# Patient Record
Sex: FEMALE | Race: AMERICAN INDIAN OR ALASKA NATIVE
[De-identification: names, ages, dates, MRNs, and addresses within clinical notes are randomized per-mention and may not be internally consistent; named-entity substitution may affect disease eponyms.]

---

## 2018-08-01 PROCEDURE — 3E0P7VZ INTRODUCTION OF HORMONE INTO FEMALE REPRODUCTIVE, VIA NATURAL OR ARTIFICIAL OPENING: ICD-10-PCS | Performed by: OBSTETRICS & GYNECOLOGY

## 2018-08-02 ENCOUNTER — HOSPITAL ENCOUNTER (INPATIENT)
Dept: HOSPITAL 5 - LD | Age: 23
LOS: 3 days | Discharge: HOME | End: 2018-08-05
Attending: OBSTETRICS & GYNECOLOGY | Admitting: OBSTETRICS & GYNECOLOGY
Payer: MEDICAID

## 2018-08-02 DIAGNOSIS — O42.92: ICD-10-CM

## 2018-08-02 DIAGNOSIS — Z3A.39: ICD-10-CM

## 2018-08-02 DIAGNOSIS — O36.5930: Primary | ICD-10-CM

## 2018-08-02 LAB
HCT VFR BLD CALC: 34.8 % (ref 30.3–42.9)
HGB BLD-MCNC: 11.7 GM/DL (ref 10.1–14.3)
MCH RBC QN AUTO: 28 PG (ref 28–32)
MCHC RBC AUTO-ENTMCNC: 34 % (ref 30–34)
MCV RBC AUTO: 82 FL (ref 79–97)
PLATELET # BLD: 263 K/MM3 (ref 140–440)
RBC # BLD AUTO: 4.24 M/MM3 (ref 3.65–5.03)

## 2018-08-02 PROCEDURE — 85014 HEMATOCRIT: CPT

## 2018-08-02 PROCEDURE — 85027 COMPLETE CBC AUTOMATED: CPT

## 2018-08-02 PROCEDURE — 85018 HEMOGLOBIN: CPT

## 2018-08-02 PROCEDURE — 87806 HIV AG W/HIV1&2 ANTB W/OPTIC: CPT

## 2018-08-02 PROCEDURE — 86850 RBC ANTIBODY SCREEN: CPT

## 2018-08-02 PROCEDURE — 86901 BLOOD TYPING SEROLOGIC RH(D): CPT

## 2018-08-02 PROCEDURE — 86900 BLOOD TYPING SEROLOGIC ABO: CPT

## 2018-08-02 PROCEDURE — 36415 COLL VENOUS BLD VENIPUNCTURE: CPT

## 2018-08-02 PROCEDURE — 88307 TISSUE EXAM BY PATHOLOGIST: CPT

## 2018-08-02 PROCEDURE — 86592 SYPHILIS TEST NON-TREP QUAL: CPT

## 2018-08-02 NOTE — HISTORY AND PHYSICAL REPORT
History of Present Illness


Date of examination: 18


Date of admission: 


18 19:32





Chief complaint: 





IUP@39weeks with IUGR, presents for induction by [facility] recommendation, states 

she was cephalic at [facility] office today. 


History of present illness: 





Past Pregnancy History 


   :      2


   Term Births:      1


   Premature Births:   0


   Living Children:   1


   Para:      1


   Mult. Births:      0


   Prev :   0


   Prev.  attempt?   0


   Aborta:      0


   Elect. Ab:      0


   Spont. Ab:      0


   Ectopics:      0





Pregnancy # 1


   Delivery date:     


   Weeks Gestation:   37


    labor:     no


   Delivery type:     


   Infant Sex:      Male


   Birth weight:      5#


   Comments:      no complications








Past Medical History:


   HSV





Past Surgical History:


   negative





Past Medical History 


Anesthesia Complications: negative


Anemia: negative


Autoimmune Disorder: negative


Bleeding Disorder: negative


Blood Transfusions: negative


Breast Disease: negative


Diabetes: negative


Heart Disease: negative


Hypertension: negative


Hepatitis/Liver Disease: negative


Kidney Disease/UTI: negative


Neurologic/Epilepsy/Migraines: negative


Phlebitis/Varicosities: negative


Psychiatric: negative


Pulmonary Disease/Asthma: negative


Thyroid Disease: negative


Hospitalizations: negative


Surgery (Non-gyn): negative


Abnormal PAP: negative





Family Hx: HTN - mother and MGM





Social Hx: Single





no ETOH/Dugs/smoking





Infection History 


Hx of STD: HSV


HIV Risk Eval: no


Hepatitis B Risk Eval: low risk


Personal hx. of genital herpes: yes


Partner hx. of genital herpes: yes


Rash, Viral, or Febrile illness since last LMP? no


Varicella/Chicken Pox Status: Immunized





Genetic History 


 Congenital Heart Defect:


    Mom: no  Dad: no


Canavan Disease:


    Mom: no  Dad: no


Thalassemia


    Mom: no  Dad: no


Neural Tube Defect


    Mom: no  Dad: no


Down's Syndrome


    Mom: no  Dad: no


Vlad-Sachs


    Mom: no  Dad: no


Sickle Cell Disease/Trait


    Mom: no  Dad: no


Hemophilia


    Mom: no  Dad: no


Muscular Dystrophy


    Mom: no  Dad: no


Cystic Fibrosis


    Mom: no  Dad: no


Perkins Chorea


    Mom: no  Dad: no


Mental Retardation


    Mom: no  Dad: no


Fragile X


    Mom: no  Dad: no


Other Genetic/Chromosomal Disorder


    Mom: no  Dad: no


Child w/other birth defect


    Mom: no  Dad: no





Enviromental Exposures 


Xray Exposure: no


Medication, drug, or alcohol use since LMP: no


Chemical/Other Exposure: no


Exposure to Cat Liter: no


Hx of Parvovirus (Fifth Disease): no


Occupational Exposure to Children: none


Current Allergies (reviewed today):


No known allergies





Past History





- Obstetrical History


Expected Date of Delivery: 18


Actual Gestation: 39 Week(s) 2 Day(s) 


: 2


Hx # Term Pregnancies: 1





Medications and Allergies


 Allergies











Allergy/AdvReac Type Severity Reaction Status Date / Time


 


No Known Allergies Allergy   Unverified 18 20:29














Review of Systems


All systems: negative





- Vital Signs


Vital signs: 


 Vital Signs











Temp Pulse Resp BP Pulse Ox


 


 98.3 F   100 H  18   108/57   97 


 


 18 20:54  18 20:54  18 20:54  18 20:54  18 20:54








 











Temp Pulse Resp BP Pulse Ox


 


 98.3 F   96 H  18   108/57   97 


 


 18 20:54  18 21:06  18 20:54  18 21:01  18 21:06














- Physical Exam


Breasts: Positive: deferred


Lungs: Positive: Normal air movement


Abdomen: Positive: normal appearance


Genitourinary (Female): Positive: other (States her last outbreak was several 

months ago, she denies prodromal symptoms or current or recent outbreak)





Results


All other labs normal.








Assessment and Plan





- Patient Problems


(1) 39 weeks gestation of pregnancy


Current Visit: Yes   Status: Acute   





(2) IUGR (intrauterine growth restriction)


Current Visit: Yes   Status: Acute   


Plan to address problem: 


Serial induction explained, questions answered, will check cervix and ? 

cervidil placement if she's still closed. She voiced understanding and agrees 

with plan of care

## 2018-08-03 PROCEDURE — 00HU33Z INSERTION OF INFUSION DEVICE INTO SPINAL CANAL, PERCUTANEOUS APPROACH: ICD-10-PCS | Performed by: ANESTHESIOLOGY

## 2018-08-03 PROCEDURE — 3E0R3BZ INTRODUCTION OF ANESTHETIC AGENT INTO SPINAL CANAL, PERCUTANEOUS APPROACH: ICD-10-PCS | Performed by: ANESTHESIOLOGY

## 2018-08-03 RX ADMIN — AMPICILLIN SODIUM SCH MLS/HR: 1 INJECTION, POWDER, FOR SOLUTION INTRAMUSCULAR; INTRAVENOUS at 17:00

## 2018-08-03 RX ADMIN — SODIUM CHLORIDE, SODIUM LACTATE, POTASSIUM CHLORIDE, AND CALCIUM CHLORIDE SCH MLS/HR: .6; .31; .03; .02 INJECTION, SOLUTION INTRAVENOUS at 12:07

## 2018-08-03 RX ADMIN — AMPICILLIN SODIUM SCH MLS/HR: 1 INJECTION, POWDER, FOR SOLUTION INTRAMUSCULAR; INTRAVENOUS at 12:07

## 2018-08-03 RX ADMIN — AMPICILLIN SODIUM SCH MLS/HR: 1 INJECTION, POWDER, FOR SOLUTION INTRAMUSCULAR; INTRAVENOUS at 05:47

## 2018-08-03 RX ADMIN — IBUPROFEN SCH MG: 600 TABLET, FILM COATED ORAL at 23:46

## 2018-08-03 RX ADMIN — SODIUM CHLORIDE, SODIUM LACTATE, POTASSIUM CHLORIDE, AND CALCIUM CHLORIDE SCH MLS/HR: .6; .31; .03; .02 INJECTION, SOLUTION INTRAVENOUS at 19:40

## 2018-08-03 RX ADMIN — AMPICILLIN SODIUM SCH MLS/HR: 1 INJECTION, POWDER, FOR SOLUTION INTRAMUSCULAR; INTRAVENOUS at 02:43

## 2018-08-03 NOTE — PROCEDURE NOTE
OB Delivery Note





- Delivery


Date of Delivery: 18 ( male)


Assistant: PARADISE NEWMAN


Estimated blood loss: 200cc





- Vaginal


Delivery presentation: vertex


Delivery position: OP


Intrapartum events: PROM->1hr before delivery, other(please specify) (IOL for 

IUGR)


Delivery induction: cervidil


Delivery augmentation: pitocin


Delivery monitor: internal FHT, internal uterine


Route of delivery: 


Delivery placenta: spontaneous


Delivery cord: 3 umbilical vessels


Episiotomy: none


Delivery laceration: none


Anesthesia: epidural


Delivery comments: 


 Male infant del over intact perineum, placed skin to skin on mother's abdomen. 

3 vessel cord clamped and cut. Placenta del intact and complete. No laceration 

to repair. , Apgars 8/8, baby's weight 5#14. Mother and infant remain 

LDR stable. 








- Infant


  ** A


Apgar at 1 minute: 8


Apgar at 5 minutes: 8


Infant Gender: Male (5#14)

## 2018-08-03 NOTE — PROGRESS NOTE
Assessment and Plan


  Patient's pitocin increased to 12mU, she is moaning and tensing with ctx. SVE 

2/70/0, vertex with strong force behind the ctx. Encouraged Patient to get 

epidural as it may help her relax and dilate, patient declines and states she 

doesn't want epidural. Will continue with current plan of care.








- Patient Problems


(1) 39 weeks gestation of pregnancy


Current Visit: Yes   Status: Acute   





(2) IUGR (intrauterine growth restriction)


Current Visit: Yes   Status: Acute   





Subjective





- Subjective


Date of service: 08/03/18


Principal diagnosis: IUP @ 39+3, IOL for IUGR


Patient reports: loss of fluid, fetal movement normal, contractions, no vaginal 

bleeding





Objective





- Vital Signs


Vital Signs: 


 Vital Signs - 12hr











  08/03/18 08/03/18 08/03/18





  05:47 05:51 07:55


 


Temperature 98.1 F  


 


Pulse Rate 86 86 96 H


 


Respiratory 16  





Rate   


 


Blood Pressure  125/74 106/64


 


Blood Pressure 125/74  





[Right]   


 


O2 Sat by Pulse 98  





Oximetry   














  08/03/18 08/03/18 08/03/18





  11:31 12:05 12:09


 


Temperature 98.2 F  


 


Pulse Rate   95 H


 


Respiratory 16 16 





Rate   


 


Blood Pressure   131/71


 


Blood Pressure   





[Right]   


 


O2 Sat by Pulse   





Oximetry   














  08/03/18 08/03/18





  15:08 15:09


 


Temperature  98.2 F


 


Pulse Rate 102 H 


 


Respiratory  16





Rate  


 


Blood Pressure 122/78 


 


Blood Pressure  





[Right]  


 


O2 Sat by Pulse  





Oximetry  














- Exam


Breasts: normal


Cardiovascular: Regular rate


Lungs: Clear to auscultation, Normal air movement


Abdomen: Present: normal appearance, soft


Vulva: both: normal


Uterus: Present: normal


FHR: auscultation normal, category 1


Uterine Contraction Monitor Mode: External


Cervical Dilatation: 2


Cervical Effacement Percentage: 70


Fetal station: 0


Uterine Contraction Frequency (min): 2-5


Uterine Contraction Duration: 60


Uterine Contraction Pattern: Regular


Uterine Tone Measurement Phase: Contraction


Uterine Contraction Intensity: Moderate


Extremities: normal





- Labs


Labs: 


 Laboratory Results - last 24 hr











  08/02/18 08/02/18 08/02/18





  21:45 21:45 21:45


 


WBC    8.1


 


RBC    4.24


 


Hgb    11.7


 


Hct    34.8


 


MCV    82


 


MCH    28


 


MCHC    34


 


RDW    14.8


 


Plt Count    263


 


RPR  Nonreactive  


 


HIV 1&2 Antibody Rapid    Non react


 


HIV P24 Antigen    Non react


 


Blood Type   B POSITIVE 


 


Antibody Screen   Negative

## 2018-08-03 NOTE — ANESTHESIA CONSULTATION
Anesthesia Consult and Med Hx


Date of service: 08/03/18





- Airway


Anesthetic Teeth Evaluation: Good


ROM Head & Neck: Adequate


Mental/Hyoid Distance: Adequate


Mallampati Class: Class II


Intubation Access Assessment: Good





- Pulmonary Exam


CTA: Yes





- Cardiac Exam


Cardiac Exam: No Murmur





- Pre-Operative Health Status


ASA Pre-Surgery Classification: ASA2


Proposed Anesthetic Plan: Epidural





- Pulmonary


Hx Asthma: No


COPD: No


Hx Pneumonia: No





- Cardiovascular System


Hx Hypertension: No





- Central Nervous System


Hx Seizures: No


Hx Psychiatric Problems: No





- Endocrine


Hx Renal Disease: No


Hx End Stage Renal Disease: No


Hx Hypothyroidism: No


Hx Hyperthyroidism: No





- Hematic


Hx Anemia: No


Hx Sickle Cell Disease: No





- Other Systems


Hx Alcohol Use: No

## 2018-08-03 NOTE — EVENT NOTE
Date: 08/03/18


 SROM clear fluid, SVE unchanged. Will begin active management pitocin. Advised 

patient she may be epidural PRN. Continue antibiotics, 4th dose infused at this 

time.

## 2018-08-03 NOTE — PROGRESS NOTE
Assessment and Plan


 remove cervidil around 1130, allow lunch and shower if FHT CAT 1 and then will 

start pitocin. 








- Patient Problems


(1) 39 weeks gestation of pregnancy


Current Visit: Yes   Status: Acute   





(2) IUGR (intrauterine growth restriction)


Current Visit: Yes   Status: Acute   





Subjective





- Subjective


Date of service: 08/03/18


Principal diagnosis: IUP @ 39+3, IOL for IUGR


Patient reports: fetal movement normal, contractions, no loss of fluid, no 

vaginal bleeding





Objective





- Vital Signs


Vital Signs: 


 Vital Signs - 12hr











  08/02/18 08/02/18 08/02/18





  20:54 21:01 21:06


 


Temperature 98.3 F  


 


Pulse Rate 100 H 94 H 96 H


 


Respiratory 18  





Rate   


 


Blood Pressure  108/57 


 


Blood Pressure 108/57  





[Right]   


 


O2 Sat by Pulse 97 98 97





Oximetry   














  08/03/18 08/03/18





  05:47 05:51


 


Temperature 98.1 F 


 


Pulse Rate 86 86


 


Respiratory 16 





Rate  


 


Blood Pressure  125/74


 


Blood Pressure 125/74 





[Right]  


 


O2 Sat by Pulse 98 





Oximetry  














- Exam


Breasts: normal


Cardiovascular: Regular rate


Lungs: Clear to auscultation, Normal air movement


Abdomen: Present: normal appearance, soft


Vulva: both: normal


Uterus: Present: normal


Uterine Contraction Monitor Mode: External


Uterine Contraction Pattern: Irregular


Uterine Tone Measurement Phase: Contraction


Uterine Contraction Intensity: Mild


Extremities: normal


Deep Tendon Reflex Grade: Normal +2





- Labs


Labs: 


 Laboratory Results - last 24 hr











  08/02/18 08/02/18





  21:45 21:45


 


WBC   8.1


 


RBC   4.24


 


Hgb   11.7


 


Hct   34.8


 


MCV   82


 


MCH   28


 


MCHC   34


 


RDW   14.8


 


Plt Count   263


 


HIV 1&2 Antibody Rapid   Non react


 


HIV P24 Antigen   Non react


 


Blood Type  B POSITIVE 


 


Antibody Screen  Negative

## 2018-08-04 LAB
HCT VFR BLD CALC: 32.4 % (ref 30.3–42.9)
HGB BLD-MCNC: 10.6 GM/DL (ref 10.1–14.3)

## 2018-08-04 RX ADMIN — DOCUSATE SODIUM SCH MG: 100 CAPSULE, LIQUID FILLED ORAL at 10:24

## 2018-08-04 RX ADMIN — IBUPROFEN SCH MG: 600 TABLET, FILM COATED ORAL at 23:08

## 2018-08-04 RX ADMIN — DOCUSATE SODIUM SCH MG: 100 CAPSULE, LIQUID FILLED ORAL at 22:07

## 2018-08-04 RX ADMIN — FERROUS SULFATE TAB 325 MG (65 MG ELEMENTAL FE) SCH MG: 325 (65 FE) TAB at 22:06

## 2018-08-04 RX ADMIN — IBUPROFEN SCH MG: 600 TABLET, FILM COATED ORAL at 18:27

## 2018-08-04 RX ADMIN — IBUPROFEN SCH MG: 600 TABLET, FILM COATED ORAL at 05:24

## 2018-08-04 RX ADMIN — IBUPROFEN SCH MG: 600 TABLET, FILM COATED ORAL at 11:15

## 2018-08-04 RX ADMIN — FERROUS SULFATE TAB 325 MG (65 MG ELEMENTAL FE) SCH MG: 325 (65 FE) TAB at 10:24

## 2018-08-04 NOTE — PROGRESS NOTE
Assessment and Plan





- Patient Problems


(1)  (spontaneous vaginal delivery)


Onset Date: ~18   Current Visit: Yes   Status: Acute   


Plan to address problem: 


Pt resting No c/o voiced VSS FF below umb Lochia small Perineum intact H&H 

pending No s/sx of anemia Doing well vag delivery P: continue pathway Advance 

diet and activity as tolerated. 








Subjective





- Subjective


Date of service: 18 (no c/o voiced)


Principal diagnosis: Day # 1 s/p vaginal delivery


Patient reports: appetite normal, voiding normally, pain well controlled, 

ambulating normally


: doing well





Objective





- Vital Signs


Latest vital signs: 


 Vital Signs











  Temp Pulse Resp BP BP Pulse Ox


 


 18 04:00  98.7 F  69  16   114/78 


 


 18 01:50  98.6 F  102 H  20   99/55  99


 


 18 23:46    16   


 


 18 23:39  97.9 F   16   


 


 18 23:36   101 H   127/68  


 


 18 23:21   100 H   107/57  


 


 18 23:14   106 H   118/60  


 


 18 22:36   96 H   130/56  


 


 18 22:21   101 H   133/62  


 


 18 22:06   99 H   133/63  


 


 18 21:31  97.7 F     


 


 18 21:23   114 H   117/86  


 


 18 21:06   101 H   98/62  


 


 18 20:51   88   106/57  


 


 18 20:37   85   114/57  


 


 18 20:21   83   121/58  


 


 18 20:08   97 H   133/64  


 


 18 20:00       71 L


 


 18 19:58   132 H     95


 


 18 19:53   110 H     100


 


 18 19:52   125 H   158/70  


 


 18 19:49   77     80 L


 


 18 19:48   98 H   142/78   100


 


 18 19:43   103 H     99


 


 18 19:38   112 H     100


 


 18 19:37   112 H     87


 


 18 19:33   84     81 L


 


 18 19:30  97.2 F L  101 H  20    62 L


 


 18 19:27   108 H     100


 


 18 19:22   99 H     100


 


 18 19:17   100 H     100


 


 18 19:12   110 H     100


 


 18 19:07   103 H     100


 


 18 19:06   104 H   114/62  


 


 18 19:02   103 H     98


 


 18 18:57   113 H     98


 


 18 18:53   107 H   112/58  


 


 18 18:52   107 H     99


 


 18 18:45   126 H   141/73  


 


 18 18:37   65     87


 


 18 18:32   107 H     79 L


 


 18 18:25   92 H     98


 


 18 18:20   97 H     98


 


 18 18:15   99 H     99


 


 18 18:10   96 H     98


 


 18 18:09   104 H   118/76  


 


 18 18:05   98 H     98


 


 18 18:00   101 H     99


 


 18 17:55   109 H     98


 


 18 17:50   102 H     98


 


 18 17:30    16   


 


 18 15:09  98.2 F   16   


 


 18 15:08   102 H   122/78  


 


 18 12:09   95 H   131/71  


 


 18 12:05    16   


 


 18 11:31  98.2 F   16   


 


 18 07:55   96 H   106/64  








 Intake and Output











 18





 22:59 06:59 14:59


 


Intake Total 1333.75  


 


Output Total 700 1700 


 


Balance 633.75 -1700 


 


Intake:   


 


  .75  


 


    Lactated Ringers 1,000 ml 943.75  





    @ 125 mls/hr IV AS   





    DIRECT CRAIG Rx#:[id number]   


 


    PITOCin/NS 30 UNIT/500ML 30  





    30 units In 500 ml @ 4   





    mls/hr IV TITR CRAIG Rx#:   





    [id number]   


 


  Oral 360  


 


Output:   


 


  Urine 700 1700 


 


    Indwelling Catheter 700 900 


 


    Void  800 


 


Other:   


 


  Total, Intake Amount 360  


 


  Total, Output Amount 700 450 


 


  Estimated Blood Loss 200  














- Exam


Breasts: Present: normal


Cardiovascular: Present: Regular rate


Lungs: Present: Normal air movement


Abdomen: Present: normal appearance, soft, normal bowel sounds


Uterus: Present: normal, fundal height below umbilicus


Extremities: Present: normal


Deep Tendon Reflex Grade: Normal +2


Incision: Present: normal, dry, intact

## 2018-08-05 VITALS — SYSTOLIC BLOOD PRESSURE: 98 MMHG | DIASTOLIC BLOOD PRESSURE: 63 MMHG

## 2018-08-05 RX ADMIN — IBUPROFEN SCH MG: 600 TABLET, FILM COATED ORAL at 05:21

## 2018-08-05 NOTE — DISCHARGE SUMMARY
Providers





- Providers


Date of Admission: 


18 19:32





Date of discharge: 18 (pt desires d/c)


Attending physician: 


YOHANA MTZ





Primary care physician: 


YOHANA MTZ








Hospitalization


Reason for admission: active labor


Delivery: 


Episiotomy: none


Laceration: none


Incision: normal


Other postpartum procedures: none


Postpartum complications: none


Discharge diagnosis: IUP at term delivered


 baby: male


Hospital course: 


uncomplicated vaginal delivery








Pt w/o complaint VSS FF below umb Lochia small Perineum intact Stable H&H Doing 

well s/p vag delivery P: d/c home today with instructions RTO 4 weeks PP visit. 

Circ in 1 week. RX provided





Condition at discharge: Good


Disposition: DC-01 TO HOME OR SELFCARE





- Discharge Diagnoses


(1)  (spontaneous vaginal delivery)


Status: Acute   Comment: RTO 1 week PP care   





Plan





- Discharge Medications


Prescriptions: 


Ibuprofen [Motrin 800 MG tab] 800 mg PO Q8HR PRN #30 tablet


 PRN Reason: Pain


Lidocain2.5%/Prilocai2.5% [Emla] 5 gm TP ONCE PRN #1 tube


 PRN Reason: Pain





- Provider Discharge Summary


Activity: routine, no sex for 6 weeks, no heavy lifting 4 weeks, no strenuous 

exercise


Diet: routine


Instructions: routine


Additional instructions: 


[]  Smoking cessation referral if applicable(refer to patient education folder 

for contact #)


[]  Refer to [facility] Booklet








Call your doctor immediately for:


* Fever > 100.5


* Heavy vaginal bleeding ( >1 pad per hour)


* Severe persistent headache


* Shortness of breath


* Reddened, hot, painful area to leg or breast


* Drainage or odor from incision.





* Keep incision clean and dry at all times and follow doctor's instructions 

regarding bathing/showering











- Follow up plan


Follow up: 


YOHANA MTZ MD [Primary Care Provider] - 7 Days


(Congratulations!


Please call [phone] to schedule your postpartum visit in 4 weeks and your 

son's visit in 1 week for circumcision. Bring EMLA cream with you to his visit. 

Do NOT use at home.


Take medications as prescribed.


Call with concerns.)